# Patient Record
Sex: MALE | Race: WHITE | NOT HISPANIC OR LATINO | Employment: FULL TIME | URBAN - METROPOLITAN AREA
[De-identification: names, ages, dates, MRNs, and addresses within clinical notes are randomized per-mention and may not be internally consistent; named-entity substitution may affect disease eponyms.]

---
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
## 2018-01-13 VITALS
BODY MASS INDEX: 21.33 KG/M2 | SYSTOLIC BLOOD PRESSURE: 116 MMHG | WEIGHT: 128 LBS | DIASTOLIC BLOOD PRESSURE: 74 MMHG | HEIGHT: 65 IN | HEART RATE: 78 BPM

## 2018-01-14 NOTE — PROGRESS NOTES
Assessment    1  Pharyngitis, streptococcal (034 0) (J02 0)    Plan  Pharyngitis, streptococcal    · Amoxicillin 875 MG Oral Tablet; one tablet twice a day   · Follow Up if Not Better Evaluation and Treatment  Follow-up  Status: Complete  Done:  45QHY7871   · Avoid exposure to cigarette smoke ; Status:Complete;   Done: 04RQB1372   · Drink at least 6 glasses of clear liquids a day ; Status:Complete;   Done: 95HCM9275   · Good hand washing is one of the best ways to control the spread of germs ;  Status:Complete;   Done: 09XPC8823   · Call [phone] if: New symptoms occur ; Status:Complete;   Done: 17AUU0420   · Call [phone] if: Your swallowing difficulty is not better in 3 days ; Status:Complete;    Done: 11TYK7356  PMH: Acute sinusitis    · Amoxicillin-Pot Clavulanate 875-125 MG Oral Tablet (Augmentin)    Discussion/Summary    Has acute pharyngitis possibly streptococcal  Am putting him on amoxicillin he will drink lots of fluids and take Advil as needed will call if not getting better in a few days  Chief Complaint  Fevers, cough, nasal congested, headaches and fevers for 5 days  History of Present Illness  HPI: 75-year-old boy started 5 days ago with sore throat head congestion  Throat feeling worse slight cough      Review of Systems    Constitutional: fever  ENT: nasal discharge, earache and sore throat  Cardiovascular: No complaints of chest pain, no palpitations, normal heart rate, no leg claudication or lower leg edema  Respiratory: cough  Gastrointestinal: No complaints of abdominal pain, no nausea or vomiting, no constipation, no diarrhea or bloody stools  Genitourinary: No complaints of testicular pain, no dysuria or nocturia, no incontinence, no hesitancy, no gential lesion  Musculoskeletal: No complaints of joint stiffness or swelling, no myalgias, no limb pain or swelling  Integumentary: No complaints of skin rash, no skin lesions or wounds, no itching, no dry skin  Neurological: No complaints of headache, no numbness or tingling, no dizziness or fainting, no confusion, no convulsions, no limb weakness or difficulty walking  Psychiatric: No complaints of feeling depressed, no suicidal thoughts, no emotional problems, no anxiety, no sleep disturbances or changes in personality  Endocrine: No complaints of muscle weakness, no feelings of weakness, no erectile dysfunction, no deepening of voice, no hot flashes or proptosis  Hematologic/Lymphatic: No complaints of swollen glands, no neck swollen glands, does not bleed or bruise easily  Active Problems    1  Allergic rhinitis (477 9) (J30 9)   2  Influenza (487 1) (J11 1)    Past Medical History    1  Acute sinusitis (461 9) (J01 90)   2  History of Need for HPV vaccination (V04 89) (Z23)  Active Problems And Past Medical History Reviewed: The active problems and past medical history were reviewed and updated today  Family History    1  No pertinent family history    2  No pertinent family history    Social History    · Never A Smoker    Current Meds   1  Amoxicillin-Pot Clavulanate 875-125 MG Oral Tablet; TAKE 1 TABLET EVERY 12   HOURS UNTIL GONE;   Therapy: 80SMF5511 to (Evaluate:04Shu6989)  Requested for: 21KOT5165; Last   Rx:[id number] Ordered   2  No Reported Medications Recorded    Allergies    1  Biaxin TABS    Vitals   Recorded: 01MRR6215 03:14PM   Temperature 99 4 F, Tympanic   Heart Rate 96   Systolic 666, LUE, Sitting   Diastolic 80, LUE, Sitting   Height 5 ft 3 5 in   Weight 124 lb 8 0 oz   BMI Calculated 21 71   BSA Calculated 1 58     Physical Exam    Constitutional - General appearance: No acute distress, well appearing and well nourished  Head and Face - Face and sinuses: Normal, no sinus tenderness  Eyes - Conjunctiva and lids: No injection, edema or discharge  Pupils and irises: Equal, round, reactive to light bilaterally     Ears, Nose, Mouth, and Throat - External inspection of ears and nose: Normal without deformities or discharge  Otoscopic examination: Tympanic membranes gray, translucent with good bony landmarks and light reflex  Canals patent without erythema  Nasal mucosa, septum, and turbinates: Normal, no edema or discharge  Oropharynx: Abnormal  The posterior pharynx was erythematous  Neck - Neck: Supple, symmetric, no masses  Pulmonary - Auscultation of lungs: Clear bilaterally  Cardiovascular - Auscultation of heart: Regular rate and rhythm, normal S1 and S2, no murmur  Abdomen - Abdomen: Normal bowel sounds, soft, non-tender, no masses  Liver and spleen: No hepatomegaly or splenomegaly        Signatures   Electronically signed by : RIKA Pelletier ; Jan 14 2016  3:35PM EST                       (Author)

## 2018-01-16 NOTE — PROGRESS NOTES
Assessment    1  Encounter for preventive health examination (V70 0) (Z00 00)    Plan  Health Maintenance    · Urine Dip Non-Automated- POC; Status:Complete;   Done: 49ZTU5945 11:25AM    Discussion/Summary  Impression: healthy adult male  Currently, he eats an adequate diet and has an adequate exercise regimen  Prostate cancer screening: PSA is not indicated  Colorectal cancer screening: colorectal cancer screening is not indicated  The immunizations are up to date  Advice and education were given regarding nutrition, weight bearing exercise and weight loss  Healthy 21year-old male  Up-to-date on vaccines and health maintenance  No acute issues today  Follow-up in 1 year or as needed  Will at some point will remove growth on right eyebrow when patient requests  Chief Complaint  Well Child Exam and EPSDT      History of Present Illness  HPI: Here for a 10-year-old physical  No acute complaints today       Review of Systems    Constitutional: No fever or chills, feels well, no tiredness, no recent weight gain or weight loss  Eyes: No complaints of eye pain, no red eyes, no discharge from eyes, no itchy eyes  ENT: no complaints of earache, no hearing loss, no nosebleeds, no nasal discharge, no sore throat, no hoarseness  Cardiovascular: No complaints of slow heart rate, no fast heart rate, no chest pain, no palpitations, no leg claudication, no lower extremity  Respiratory: No complaints of shortness of breath, no wheezing, no cough, no SOB on exertion, no orthopnea or PND  Gastrointestinal: No complaints of abdominal pain, no constipation, no nausea or vomiting, no diarrhea or bloody stools  Genitourinary: No complaints of dysuria, no incontinence, no hesitancy, no nocturia, no genital lesion, no testicular pain  Musculoskeletal: No complaints of arthralgia, no myalgias, no joint swelling or stiffness, no limb pain or swelling     Integumentary: No complaints of skin rash or skin lesions, no itching, no skin wound, no dry skin  Neurological: No compliants of headache, no confusion, no convulsions, no numbness or tingling, no dizziness or fainting, no limb weakness, no difficulty walking  Psychiatric: Is not suicidal, no sleep disturbances, no anxiety or depression, no change in personality, no emotional problems  Endocrine: No complaints of proptosis, no hot flashes, no muscle weakness, no erectile dysfunction, no deepening of the voice, no feelings of weakness  Hematologic/Lymphatic: No complaints of swollen glands, no swollen glands in the neck, does not bleed easily, no easy bruising  Active Problems    1  Allergic rhinitis (477 9) (J30 9)   2  Anxiety (300 00) (F41 9)    Past Medical History    · Acute sinusitis (461 9) (J01 90)   · History of Need for HPV vaccination (V04 89) (Z23)    Family History  Mother    · No pertinent family history  Father    · No pertinent family history    Social History    · Never A Smoker    Allergies    1  Biaxin TABS    Vitals   Recorded: 56KWE4516 11:23AM   Heart Rate 78   Systolic 855, LUE, Sitting   Diastolic 74, LUE, Sitting   Height 5 ft 4 5 in   Weight 128 lb    BMI Calculated 21 63   BSA Calculated 1 63     Physical Exam    Constitutional   General appearance: No acute distress, well appearing and well nourished  Head and Face   Head and face: Normal     Palpation of the face and sinuses: No sinus tenderness  Eyes   Conjunctiva and lids: No erythema, swelling or discharge  Pupils and irises: Equal, round, reactive to light  Ears, Nose, Mouth, and Throat   External inspection of ears and nose: Normal     Otoscopic examination: Tympanic membranes translucent with normal light reflex  Canals patent without erythema  Hearing: Normal     Nasal mucosa, septum, and turbinates: Normal without edema or erythema  Lips, teeth, and gums: Normal, good dentition  Oropharynx: Normal with no erythema, edema, exudate or lesions      Neck   Neck: Supple, symmetric, trachea midline, no masses  Thyroid: Normal, no thyromegaly  Pulmonary   Respiratory effort: No increased work of breathing or signs of respiratory distress  Auscultation of lungs: Clear to auscultation  Cardiovascular   Auscultation of heart: Normal rate and rhythm, normal S1 and S2, no murmurs  Examination of extremities for edema and/or varicosities: Normal     Abdomen   Abdomen: Non-tender, no masses  Lymphatic   Palpation of lymph nodes in neck: No lymphadenopathy  Musculoskeletal   Gait and station: Normal     Inspection/palpation of digits and nails: Normal without clubbing or cyanosis  Inspection/palpation of joints, bones, and muscles: Normal     Range of motion: Normal     Stability: Normal     Muscle strength/tone: Normal     Skin   Skin and subcutaneous tissue: Normal without rashes or lesions  Palpation of skin and subcutaneous tissue: Normal turgor  Neurologic   Cranial nerves: Cranial nerves 2-12 intact  Cortical function: Normal mental status  Coordination: Normal finger to nose and heel to shin  Psychiatric   Judgment and insight: Normal     Orientation to person, place and time: Normal     Recent and remote memory: Intact  Mood and affect: Normal        Results/Data  Urine Dip Non-Automated- POC [id number] 11:25AM Sneha Nabila     Test Name Result Flag Reference   Color Yellow     Clarity Transparent     Leukocytes neg     Nitrite neg     Blood neg     Bilirubin neg     Urobilinogen normal     Protein neg     Ph 5 0     Specific Gravity 1 005     Ketone neg     Glucose neg         Procedure    Procedure: Hearing Acuity Test    Indication: Routine screeing  Audiometry: Normal bilaterally  Procedure: Visual Acuity Test    Indication: routine screening  Inforrmation supplied by a Snellen chart  Results: 20/20 in the right eye with corrective device, 20/25 in the left eye with corrective device Patient wears glasses        Future Appointments    Date/Time Provider Specialty Site   11/14/2017 01:30 PM Sharon Ruano DO Family Medicine [facility], [place], operated by [facility]     Signatures   Electronically signed by : Nathan Zaragoza DO; Oct 31 2017 12:58PM EST                       (Author)

## 2018-01-22 VITALS
WEIGHT: 128 LBS | HEART RATE: 84 BPM | DIASTOLIC BLOOD PRESSURE: 50 MMHG | HEIGHT: 65 IN | SYSTOLIC BLOOD PRESSURE: 118 MMHG | BODY MASS INDEX: 21.33 KG/M2

## 2018-02-28 ENCOUNTER — OFFICE VISIT (OUTPATIENT)
Dept: FAMILY MEDICINE CLINIC | Facility: CLINIC | Age: 18
End: 2018-02-28
Payer: COMMERCIAL

## 2018-02-28 VITALS
HEART RATE: 72 BPM | BODY MASS INDEX: 23.22 KG/M2 | TEMPERATURE: 99.1 F | HEIGHT: 64 IN | SYSTOLIC BLOOD PRESSURE: 120 MMHG | WEIGHT: 136 LBS | DIASTOLIC BLOOD PRESSURE: 66 MMHG

## 2018-02-28 DIAGNOSIS — J02.9 ACUTE PHARYNGITIS, UNSPECIFIED ETIOLOGY: ICD-10-CM

## 2018-02-28 DIAGNOSIS — J02.9 PHARYNGITIS, UNSPECIFIED ETIOLOGY: Primary | ICD-10-CM

## 2018-02-28 PROBLEM — D49.2 SKIN NEOPLASM: Status: ACTIVE | Noted: 2017-11-14

## 2018-02-28 PROCEDURE — 99213 OFFICE O/P EST LOW 20 MIN: CPT | Performed by: NURSE PRACTITIONER

## 2018-02-28 PROCEDURE — 3008F BODY MASS INDEX DOCD: CPT | Performed by: NURSE PRACTITIONER

## 2018-02-28 PROCEDURE — 87880 STREP A ASSAY W/OPTIC: CPT | Performed by: NURSE PRACTITIONER

## 2018-02-28 RX ORDER — AMOXICILLIN 875 MG/1
875 TABLET, COATED ORAL 2 TIMES DAILY
Qty: 20 TABLET | Refills: 0 | Status: SHIPPED | OUTPATIENT
Start: 2018-02-28 | End: 2018-03-10

## 2018-02-28 NOTE — PROGRESS NOTES
Assessment/Plan   Diagnoses and all orders for this visit:    Pharyngitis, unspecified etiology  -     amoxicillin (AMOXIL) 875 mg tablet; Take 1 tablet (875 mg total) by mouth 2 (two) times a day for 10 days  -     POCT rapid strepA    Acute pharyngitis, unspecified etiology        Chief Complaint   Patient presents with    Sore Throat     x 1 & 1/2 weeks    Fever       Subjective   Patient ID: Indigo Stuart is a 21 y o  male  Vitals:    02/28/18 1801   BP: 120/66   Pulse: 72   Temp: 99 1 °F (37 3 °C)     1  Debrox 3-4 drops in left ear for 4-5 days  The schedule follow up for ear lavage  2  Sore Throat    This is a new problem  The current episode started in the past 7 days  The problem has been gradually worsening  Neither side of throat is experiencing more pain than the other  The maximum temperature recorded prior to his arrival was 100 4 - 100 9 F  The fever has been present for 1 to 2 days  The pain is at a severity of 8/10  The pain is severe  Associated symptoms include coughing, headaches and swollen glands  Pertinent negatives include no abdominal pain, congestion, diarrhea, drooling, ear discharge, ear pain, hoarse voice, plugged ear sensation, neck pain, shortness of breath, stridor, trouble swallowing or vomiting  Exposure to: unknown exposure   He has tried cool liquids and acetaminophen for the symptoms  The treatment provided no relief  The following portions of the patient's history were reviewed and updated as appropriate: current medications, past family history, past medical history, past social history, past surgical history and problem list     Review of Systems   HENT: Positive for sore throat  Negative for congestion, drooling, ear discharge, ear pain, hoarse voice and trouble swallowing  Respiratory: Positive for cough  Negative for shortness of breath and stridor  Gastrointestinal: Negative for abdominal pain, diarrhea and vomiting     Musculoskeletal: Negative for neck pain  Neurological: Positive for headaches  Hematological: Negative  Psychiatric/Behavioral: Negative  Objective     Physical Exam   Constitutional: He is oriented to person, place, and time  He appears well-nourished  No distress  HENT:   Head: Normocephalic  Right Ear: Tympanic membrane is not injected, not erythematous and not bulging  No middle ear effusion  Left Ear: A middle ear effusion (unable to view tympanic due to cerumen inpaction ) is present  Nose: No mucosal edema, rhinorrhea or sinus tenderness  Right sinus exhibits frontal sinus tenderness  Right sinus exhibits no maxillary sinus tenderness  Left sinus exhibits frontal sinus tenderness  Left sinus exhibits no maxillary sinus tenderness  Mouth/Throat: Uvula is midline and mucous membranes are normal  No uvula swelling  Oropharyngeal exudate (white spots scattered on tonsile), posterior oropharyngeal erythema and tonsillar abscesses present  Tonsils are 2+ on the right  Tonsils are 2+ on the left  Tonsillar exudate  Eyes: Conjunctivae are normal  Right eye exhibits no discharge  Left eye exhibits no discharge  No scleral icterus  Neck: Normal range of motion  Neck supple  No tracheal deviation present  Cardiovascular: Normal rate, regular rhythm and normal heart sounds  Exam reveals no friction rub  No murmur heard  Pulmonary/Chest: Effort normal and breath sounds normal  No respiratory distress  He has no wheezes  He has no rales  Abdominal: Soft  He exhibits no distension  There is no tenderness  Musculoskeletal: Normal range of motion  Lymphadenopathy:     He has no cervical adenopathy  Neurological: He is alert and oriented to person, place, and time  Skin: Skin is warm and dry  Capillary refill takes less than 2 seconds  No rash noted  He is not diaphoretic  No erythema

## 2018-02-28 NOTE — PATIENT INSTRUCTIONS
1  Debrox 3-4 drops in left ear for few days, then follow up for ear lavage  2  Amoxicillin twice a day until finished  3  Motrin or tylenol for fever and pain  4   Supportive care- rest and fluids

## 2019-03-06 ENCOUNTER — OFFICE VISIT (OUTPATIENT)
Dept: FAMILY MEDICINE CLINIC | Facility: CLINIC | Age: 19
End: 2019-03-06
Payer: COMMERCIAL

## 2019-03-06 VITALS
BODY MASS INDEX: 20.83 KG/M2 | HEIGHT: 64 IN | SYSTOLIC BLOOD PRESSURE: 120 MMHG | WEIGHT: 122 LBS | DIASTOLIC BLOOD PRESSURE: 80 MMHG | HEART RATE: 115 BPM | TEMPERATURE: 99.5 F

## 2019-03-06 DIAGNOSIS — J32.9 SINUSITIS, UNSPECIFIED CHRONICITY, UNSPECIFIED LOCATION: Primary | ICD-10-CM

## 2019-03-06 PROCEDURE — 3008F BODY MASS INDEX DOCD: CPT | Performed by: FAMILY MEDICINE

## 2019-03-06 PROCEDURE — 1036F TOBACCO NON-USER: CPT | Performed by: FAMILY MEDICINE

## 2019-03-06 PROCEDURE — 99213 OFFICE O/P EST LOW 20 MIN: CPT | Performed by: FAMILY MEDICINE

## 2019-03-06 RX ORDER — METHYLPREDNISOLONE 4 MG/1
TABLET ORAL
Qty: 21 EACH | Refills: 0 | Status: SHIPPED | OUTPATIENT
Start: 2019-03-06 | End: 2020-11-24 | Stop reason: ALTCHOICE

## 2019-03-06 RX ORDER — AMOXICILLIN AND CLAVULANATE POTASSIUM 875; 125 MG/1; MG/1
1 TABLET, FILM COATED ORAL EVERY 12 HOURS SCHEDULED
Qty: 14 TABLET | Refills: 0 | Status: SHIPPED | OUTPATIENT
Start: 2019-03-06 | End: 2019-03-13

## 2019-03-06 NOTE — PROGRESS NOTES
Assessment/Plan:     Diagnoses and all orders for this visit:    Sinusitis, unspecified chronicity, unspecified location  -     methylPREDNISolone 4 MG tablet therapy pack; Use as directed on package  -     amoxicillin-clavulanate (AUGMENTIN) 875-125 mg per tablet; Take 1 tablet by mouth every 12 (twelve) hours for 7 days            Subjective:     Chief Complaint   Patient presents with    Cough     Cough, sinus pressure, right crusty eye for 2 weeks        Patient ID: Bakari Garcia is a 22 y o  male  Patient here for 3 weeks of cough congestion  States he is having pain and pressure in his left maxillary sinus  Is also having pain in his teeth and jaw on that side  His thick mucus and a harsh cough as well  No fevers  States he got better initially but then started to get much worse the past few days      The following portions of the patient's history were reviewed and updated as appropriate: allergies, current medications, past family history, past medical history, past social history, past surgical history and problem list     Review of Systems   Constitutional: Negative  HENT: Positive for congestion, sinus pressure and sinus pain  Eyes: Negative  Respiratory: Positive for cough  Cardiovascular: Negative  Gastrointestinal: Negative  Endocrine: Negative  Genitourinary: Negative  Musculoskeletal: Negative  Skin: Negative  Allergic/Immunologic: Negative  Neurological: Negative  Hematological: Negative  Psychiatric/Behavioral: Negative  All other systems reviewed and are negative  Objective:    Vitals:    03/06/19 1536   BP: 120/80   BP Location: Left arm   Patient Position: Sitting   Cuff Size: Standard   Pulse: (!) 115   Temp: 99 5 °F (37 5 °C)   TempSrc: Tympanic   Weight: 55 3 kg (122 lb)   Height: 5' 4" (1 626 m)          Physical Exam   Constitutional: He is oriented to person, place, and time  He appears well-developed and well-nourished  No distress  HENT:   Head: Normocephalic  Right Ear: External ear normal    Left Ear: External ear normal    Nose: Nose normal    Mouth/Throat: Oropharynx is clear and moist    Pain and pressure to left maxillary sinus  Significant thick congestion in the nostrils   Eyes: Pupils are equal, round, and reactive to light  Conjunctivae and EOM are normal  Right eye exhibits no discharge  Left eye exhibits no discharge  Neck: Normal range of motion  Cardiovascular: Normal rate, regular rhythm and normal heart sounds  Pulmonary/Chest: Effort normal and breath sounds normal    Abdominal: Soft  Bowel sounds are normal  He exhibits no distension  There is no tenderness  Musculoskeletal: Normal range of motion  Neurological: He is alert and oriented to person, place, and time  No cranial nerve deficit  Skin: Skin is warm and dry  No rash noted  Psychiatric: He has a normal mood and affect  His behavior is normal  Judgment and thought content normal    Nursing note and vitals reviewed

## 2019-11-11 ENCOUNTER — TELEPHONE (OUTPATIENT)
Dept: FAMILY MEDICINE CLINIC | Facility: CLINIC | Age: 19
End: 2019-11-11

## 2019-11-11 NOTE — TELEPHONE ENCOUNTER
WELL BABY PHYSICAL EXAM DUE / parc due      Left message with patient's family to call office back and schedule his physical exam

## 2020-11-24 ENCOUNTER — OFFICE VISIT (OUTPATIENT)
Dept: FAMILY MEDICINE CLINIC | Facility: CLINIC | Age: 20
End: 2020-11-24
Payer: COMMERCIAL

## 2020-11-24 VITALS
TEMPERATURE: 98.8 F | WEIGHT: 128.6 LBS | HEART RATE: 113 BPM | HEIGHT: 64 IN | SYSTOLIC BLOOD PRESSURE: 126 MMHG | RESPIRATION RATE: 16 BRPM | DIASTOLIC BLOOD PRESSURE: 82 MMHG | BODY MASS INDEX: 21.95 KG/M2 | OXYGEN SATURATION: 98 %

## 2020-11-24 DIAGNOSIS — S60.10XA SUBUNGUAL HEMATOMA OF FINGER OF LEFT HAND, INITIAL ENCOUNTER: Primary | ICD-10-CM

## 2020-11-24 PROCEDURE — 3725F SCREEN DEPRESSION PERFORMED: CPT | Performed by: FAMILY MEDICINE

## 2020-11-24 PROCEDURE — 99213 OFFICE O/P EST LOW 20 MIN: CPT | Performed by: FAMILY MEDICINE

## 2020-11-24 PROCEDURE — 1036F TOBACCO NON-USER: CPT | Performed by: FAMILY MEDICINE

## 2020-11-30 ENCOUNTER — OFFICE VISIT (OUTPATIENT)
Dept: OBGYN CLINIC | Facility: CLINIC | Age: 20
End: 2020-11-30
Payer: COMMERCIAL

## 2020-11-30 ENCOUNTER — APPOINTMENT (OUTPATIENT)
Dept: RADIOLOGY | Facility: CLINIC | Age: 20
End: 2020-11-30
Payer: COMMERCIAL

## 2020-11-30 VITALS
SYSTOLIC BLOOD PRESSURE: 142 MMHG | DIASTOLIC BLOOD PRESSURE: 98 MMHG | HEIGHT: 64 IN | BODY MASS INDEX: 21.99 KG/M2 | WEIGHT: 128.8 LBS

## 2020-11-30 DIAGNOSIS — S60.10XA SUBUNGUAL HEMATOMA OF DIGIT OF HAND, INITIAL ENCOUNTER: ICD-10-CM

## 2020-11-30 DIAGNOSIS — S60.10XA SUBUNGUAL HEMATOMA OF DIGIT OF HAND, INITIAL ENCOUNTER: Primary | ICD-10-CM

## 2020-11-30 PROCEDURE — 99203 OFFICE O/P NEW LOW 30 MIN: CPT | Performed by: ORTHOPAEDIC SURGERY

## 2020-11-30 PROCEDURE — 73140 X-RAY EXAM OF FINGER(S): CPT

## 2020-11-30 PROCEDURE — 3008F BODY MASS INDEX DOCD: CPT | Performed by: FAMILY MEDICINE

## 2021-10-13 ENCOUNTER — OFFICE VISIT (OUTPATIENT)
Dept: FAMILY MEDICINE CLINIC | Facility: CLINIC | Age: 21
End: 2021-10-13
Payer: COMMERCIAL

## 2021-10-13 VITALS
SYSTOLIC BLOOD PRESSURE: 140 MMHG | OXYGEN SATURATION: 99 % | WEIGHT: 139.2 LBS | DIASTOLIC BLOOD PRESSURE: 100 MMHG | HEIGHT: 64 IN | HEART RATE: 113 BPM | BODY MASS INDEX: 23.76 KG/M2 | RESPIRATION RATE: 16 BRPM | TEMPERATURE: 97.5 F

## 2021-10-13 DIAGNOSIS — H60.92 OTITIS EXTERNA OF LEFT EAR, UNSPECIFIED CHRONICITY, UNSPECIFIED TYPE: ICD-10-CM

## 2021-10-13 DIAGNOSIS — H61.22 IMPACTED CERUMEN OF LEFT EAR: Primary | ICD-10-CM

## 2021-10-13 PROCEDURE — 3725F SCREEN DEPRESSION PERFORMED: CPT | Performed by: FAMILY MEDICINE

## 2021-10-13 PROCEDURE — 3008F BODY MASS INDEX DOCD: CPT | Performed by: FAMILY MEDICINE

## 2021-10-13 PROCEDURE — 99213 OFFICE O/P EST LOW 20 MIN: CPT | Performed by: FAMILY MEDICINE

## 2021-10-13 PROCEDURE — 69210 REMOVE IMPACTED EAR WAX UNI: CPT | Performed by: FAMILY MEDICINE

## 2021-10-13 PROCEDURE — 1036F TOBACCO NON-USER: CPT | Performed by: FAMILY MEDICINE

## 2023-06-05 ENCOUNTER — TELEPHONE (OUTPATIENT)
Dept: FAMILY MEDICINE CLINIC | Facility: CLINIC | Age: 23
End: 2023-06-05

## 2023-06-05 NOTE — TELEPHONE ENCOUNTER
Bobby Poisson called saying their son was born on [birth year] and wants you as PCP for him- He stated the Hospital wants [facility] seen tomorrow- Is there a spot that works for you?   Please advise  Thank you

## 2025-02-05 ENCOUNTER — OFFICE VISIT (OUTPATIENT)
Dept: FAMILY MEDICINE CLINIC | Facility: CLINIC | Age: 25
End: 2025-02-05
Payer: COMMERCIAL

## 2025-02-05 VITALS
SYSTOLIC BLOOD PRESSURE: 120 MMHG | HEART RATE: 119 BPM | BODY MASS INDEX: 25.33 KG/M2 | RESPIRATION RATE: 16 BRPM | WEIGHT: 148.4 LBS | DIASTOLIC BLOOD PRESSURE: 82 MMHG | HEIGHT: 64 IN | OXYGEN SATURATION: 97 % | TEMPERATURE: 100.5 F

## 2025-02-05 DIAGNOSIS — J10.1 INFLUENZA A: ICD-10-CM

## 2025-02-05 DIAGNOSIS — R50.9 FEVER, UNSPECIFIED FEVER CAUSE: ICD-10-CM

## 2025-02-05 DIAGNOSIS — J10.1 INFLUENZA A: Primary | ICD-10-CM

## 2025-02-05 LAB
SARS-COV-2 AG UPPER RESP QL IA: NEGATIVE
SL AMB POCT RAPID FLU A: POSITIVE
SL AMB POCT RAPID FLU B: NEGATIVE
VALID CONTROL: NORMAL

## 2025-02-05 PROCEDURE — 87804 INFLUENZA ASSAY W/OPTIC: CPT | Performed by: FAMILY MEDICINE

## 2025-02-05 PROCEDURE — 87811 SARS-COV-2 COVID19 W/OPTIC: CPT | Performed by: FAMILY MEDICINE

## 2025-02-05 PROCEDURE — 99213 OFFICE O/P EST LOW 20 MIN: CPT | Performed by: FAMILY MEDICINE

## 2025-02-05 RX ORDER — OSELTAMIVIR PHOSPHATE 75 MG/1
75 CAPSULE ORAL EVERY 12 HOURS SCHEDULED
Qty: 10 CAPSULE | Refills: 0 | Status: SHIPPED | OUTPATIENT
Start: 2025-02-05 | End: 2025-02-10

## 2025-02-05 NOTE — PROGRESS NOTES
"Name: Ang Hernández      : 2000      MRN: [id number]  Encounter Provider: Joseph DO Bindu  Encounter Date: 2025   Encounter department: [facility]  :  Assessment & Plan  Influenza A    Orders:    oseltamivir (TAMIFLU) 75 mg capsule; Take 1 capsule (75 mg total) by mouth every 12 (twelve) hours for 5 days    Fever, unspecified fever cause    Orders:    POCT Rapid Covid Ag    POCT rapid flu A and B    Patient is positive for influenza A  Tamiflu ordered  Supportive and symptomatic care discussed  Follow-up as needed      Depression Screening and Follow-up Plan: Patient was screened for depression during today's encounter. They screened negative with a PHQ-2 score of 0.      History of Present Illness   Patient presents today for fever chills  Symptoms started to 3 days ago  Patient has significant body aches some minor congestion and cough  Fevers for 3 days as well as chills  Tested positive for influenza A in our office      Review of Systems   Constitutional:  Positive for chills, fatigue and fever.   HENT:  Positive for congestion.    Eyes: Negative.    Respiratory:  Positive for cough.    Cardiovascular: Negative.    Gastrointestinal: Negative.    Endocrine: Negative.    Genitourinary: Negative.    Musculoskeletal:  Positive for myalgias.   Skin: Negative.    Allergic/Immunologic: Negative.    Neurological: Negative.    Hematological: Negative.    Psychiatric/Behavioral: Negative.     All other systems reviewed and are negative.      Objective   /82 (BP Location: Left arm, Patient Position: Sitting, Cuff Size: Standard)   Pulse (!) 119   Temp 100.5 °F (38.1 °C) (Tympanic)   Resp 16   Ht 5' 4\" (1.626 m)   Wt 67.3 kg (148 lb 6.4 oz)   SpO2 97%   BMI 25.47 kg/m²      Physical Exam  Vitals and nursing note reviewed.   Constitutional:       Appearance: Normal appearance. He is well-developed. He is ill-appearing.   HENT:      Head: Normocephalic.      Right Ear: " External ear normal.      Left Ear: External ear normal.      Nose: Nose normal.   Eyes:      Conjunctiva/sclera: Conjunctivae normal.      Pupils: Pupils are equal, round, and reactive to light.   Cardiovascular:      Rate and Rhythm: Normal rate and regular rhythm.      Heart sounds: Normal heart sounds.   Pulmonary:      Effort: Pulmonary effort is normal.      Breath sounds: Normal breath sounds.   Abdominal:      General: Bowel sounds are normal.      Palpations: Abdomen is soft.   Musculoskeletal:         General: Normal range of motion.      Cervical back: Normal range of motion and neck supple.   Skin:     General: Skin is warm and dry.   Neurological:      General: No focal deficit present.      Mental Status: He is alert and oriented to person, place, and time.   Psychiatric:         Behavior: Behavior normal.         Thought Content: Thought content normal.         Judgment: Judgment normal.

## 2025-02-05 NOTE — LETTER
February 5, 2025     Patient: Ang Hernández  YOB: 2000  Date of Visit: 2/5/2025      To Whom it May Concern:    Ang Hernández is under my professional care. Ang was seen in my office on 2/5/2025. Ang may return to work on 2/10/25 .    If you have any questions or concerns, please don't hesitate to call.         Sincerely,          Joseph Ruano, DO        CC: No Recipients

## 2025-02-06 RX ORDER — OSELTAMIVIR PHOSPHATE 75 MG/1
CAPSULE ORAL
Qty: 10 CAPSULE | Refills: 0 | OUTPATIENT
Start: 2025-02-06